# Patient Record
(demographics unavailable — no encounter records)

---

## 2025-06-10 NOTE — PHYSICAL EXAM
[Well Nourished] : well nourished [Well Developed] : well developed [Well Groomed] : well groomed [Active] : active [Alert] : ~L alert [Normal Breathing Pattern] : normal breathing pattern [No Nasal Drainage] : no nasal drainage [No Respiratory Distress] : no respiratory distress [No Oral Cyanosis] : no oral cyanosis [No Tonsillar Enlargement] : no tonsillar enlargement [No Stridor] : no stridor [Absence Of Retractions] : absence of retractions [Symmetric] : symmetric [Good Expansion] : good expansion [No Acc Muscle Use] : no accessory muscle use [Equal Breath Sounds] : equal breath sounds bilaterally [Good aeration to bases] : good aeration to bases [No Crackles] : no crackles [No Rhonchi] : no rhonchi [No Wheezing] : no wheezing [Normal Sinus Rhythm] : normal sinus rhythm [Soft, Non-Tender] : soft, non-tender [Full ROM] : full range of motion [No Clubbing] : no clubbing [Capillary Refill < 2 secs] : capillary refill less than two seconds [Abnormal Walk] : normal gait [No Cyanosis] : no cyanosis [Alert and  Oriented] : alert and oriented [No Rashes] : no rashes

## 2025-06-10 NOTE — CONSULT LETTER
[Dear  ___] : Dear  [unfilled], [Consult Letter:] : I had the pleasure of evaluating your patient, [unfilled]. [Please see my note below.] : Please see my note below. [Consult Closing:] : Thank you very much for allowing me to participate in the care of this patient.  If you have any questions, please do not hesitate to contact me. [Sincerely,] : Sincerely, [FreeTextEntry3] : KENN Delacruz Nurse Practitioner Division of Pediatric Pulmonary Medicine [facility]  [facility]

## 2025-06-10 NOTE — SOCIAL HISTORY
[Parent(s)] : parent(s) [___ Sisters] : [unfilled] sisters [None] : none [Smokers in Household] : there are smokers in the home [FreeTextEntry1] : older siblings in school.  [Bedroom] : not in the bedroom [Living Area] : not in the living area [de-identified] : mother outside the home.

## 2025-06-10 NOTE — HISTORY OF PRESENT ILLNESS
[FreeTextEntry1] : 36 weeker No NICU stay.  Cow's milk protein sensitivity. +RSV Dec 2024: Admitted to [facility]: +desat to 80% - first time use of albuterol. Received OCS (prednisone).  Since then, recurrent cough/wheeze with viral illnesses. Positive albuterol response.    1.5 weeks ago +URI, Received dexamethasone from PCP, parents report child's whole body was stiff and shaking, parents took child to ER worried he was having a seizure. Reportedly told he has allergy to dexamethasone.  Family changed Pediatricians, new PCP prescribed Augmentin for cough with positive response. Today is day 8/10.   New PCP referred to Pulmonology: [facility]. New PCP also advised Asmanex BID 2 puffs (parents have medication at home but did not start).  Albuterol last used a few days ago.    +API: +Fam hx of asthma: mother (childhood) and maternal uncle (severe).    NEW PATIENT - Ronnie 10, 2025 Age of Onset of symptoms: Dec 2024 +RSV.  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  PSH: denies Meds: albuterol nebs and HFA.  Allergies: cow's milk protein  Birth Hx: 36 weeker, no complications.  PCP/Specialists: [facility].   Family hx of asthma: YES (mother and maternal uncle).  Family hx of cystic fibrosis, autoimmune disease, recurrent respiratory infections: denies Feeding issues, JENNY: denies JENNIFER sx: denies Hx of Eczema: NO Hx of rhinitis, postnasal drip: NO Hx of recurrent infections (ie: pneumonia, AOM, sinusitis): NO Seen by pulmonologist before: NO   Cough Hx Triggers: URIs Hx of wheezing: YES Use of oral steroids: YES ED/Hospitalizations: one hospital admission Dec 2024, +RSV Daytime cough:  Denies. Well between episodes. Nighttime cough:  Denies. Well between episodes. Respiratory symptoms with exercise: SOB infrequently noted when running.  Chest x-ray: NO Vaccines UTD: YES Influenza vaccine: NO COVID 19 vaccine: NO H/o COVID19/RSV infection: RSV+ Dec 2024.      Modified Asthma Predictive Index (mAPI): 4 wheezing illnesses AND 1 major criteria Parental asthma: YES (mother and maternal uncle) atopic dermatitis: NO Aeroallergen sensitization suspected: NO   OR   2 minor criteria Food sensitization: YES (cow's milk protein) Peripheral blood eosinophilia =4%: Wheezing apart from colds: NO

## 2025-06-10 NOTE — REVIEW OF SYSTEMS
[Fever] : no fever [Snoring] : no snoring [Apnea] : no apnea [Nasal Congestion] : no nasal congestion [Wheezing] : wheezing [Heart Disease] : no heart disease [Cough] : cough [Pneumonia] : no pneumonia [Problems Swallowing] : no problems swallowing [Eczema] : no ezcema [Reflux] : no reflux [Failure To Thrive] : no failure to thrive [FreeTextEntry6] : no current cough/wheeze [Influenza Vaccine this Past Year] : no influenza vaccine this past year [Immunizations are up to date] : Immunizations are up to date

## 2025-06-10 NOTE — CONSULT LETTER
[Dear  ___] : Dear  [unfilled], [Consult Letter:] : I had the pleasure of evaluating your patient, [unfilled]. [Please see my note below.] : Please see my note below. [Consult Closing:] : Thank you very much for allowing me to participate in the care of this patient.  If you have any questions, please do not hesitate to contact me. [Sincerely,] : Sincerely, [FreeTextEntry3] : KENN Delacruz Nurse Practitioner Division of Pediatric Pulmonary Medicine & Cyst[facility] Fibrosis Center  [facility]

## 2025-06-10 NOTE — REVIEW OF SYSTEMS
[Fever] : no fever [Snoring] : no snoring [Apnea] : no apnea [Nasal Congestion] : no nasal congestion [Heart Disease] : no heart disease [Wheezing] : wheezing [Cough] : cough [Pneumonia] : no pneumonia [Problems Swallowing] : no problems swallowing [Reflux] : no reflux [Eczema] : no ezcema [Failure To Thrive] : no failure to thrive [FreeTextEntry6] : no current cough/wheeze [Influenza Vaccine this Past Year] : no influenza vaccine this past year [Immunizations are up to date] : Immunizations are up to date

## 2025-06-10 NOTE — SOCIAL HISTORY
[Parent(s)] : parent(s) [___ Sisters] : [unfilled] sisters [None] : none [Smokers in Household] : there are smokers in the home [FreeTextEntry1] : older siblings in school.  [Living Area] : not in the living area [Bedroom] : not in the bedroom [de-identified] : mother outside the home.

## 2025-06-10 NOTE — PHYSICAL EXAM
[Well Nourished] : well nourished [Well Developed] : well developed [Well Groomed] : well groomed [Alert] : ~L alert [Active] : active [Normal Breathing Pattern] : normal breathing pattern [No Nasal Drainage] : no nasal drainage [No Respiratory Distress] : no respiratory distress [No Tonsillar Enlargement] : no tonsillar enlargement [No Oral Cyanosis] : no oral cyanosis [No Stridor] : no stridor [Symmetric] : symmetric [Absence Of Retractions] : absence of retractions [No Acc Muscle Use] : no accessory muscle use [Good Expansion] : good expansion [Equal Breath Sounds] : equal breath sounds bilaterally [Good aeration to bases] : good aeration to bases [No Crackles] : no crackles [No Rhonchi] : no rhonchi [No Wheezing] : no wheezing [Normal Sinus Rhythm] : normal sinus rhythm [Soft, Non-Tender] : soft, non-tender [Full ROM] : full range of motion [No Clubbing] : no clubbing [Capillary Refill < 2 secs] : capillary refill less than two seconds [Alert and  Oriented] : alert and oriented [Abnormal Walk] : normal gait [No Cyanosis] : no cyanosis [No Rashes] : no rashes

## 2025-06-10 NOTE — HISTORY OF PRESENT ILLNESS
[FreeTextEntry1] : 36 weeker No NICU stay.  Cow's milk protein sensitivity. +RSV Dec 2024: Admitted to [facility]: +desat to 80% - first time use of albuterol. Received OCS (prednisone).  Since then, recurrent cough/wheeze with viral illnesses. Positive albuterol response.    1.5 weeks ago +URI, Received dexamethasone from PCP, parents report child's whole body was stiff and shaking, parents took child to ER worried he was having a seizure. Reportedly told he has allergy to dexamethasone.  Family changed Pediatricians, new PCP prescribed Augmentin for cough with positive response. Today is day 8/10.   New PCP referred to Pulmonology: [facility]. New PCP also advised Asmanex BID 2 puffs (parents have medication at home but did not start).  Albuterol last used a few days ago.    +API: +Fam hx of asthma: mother (childhood) and maternal uncle (severe).    NEW PATIENT - Ronnie 10, 2025 Age of Onset of symptoms: Dec 2024 +RSV.  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  PSH: denies Meds: albuterol nebs and HFA.  Allergies: cow's milk protein  Birth Hx: 36 weeker, no complications.  PCP/Specialists: [facility].   Family hx of asthma: YES (mother and maternal uncle).  Family hx of cystic fibrosis, autoimmune disease, recurrent respiratory infections: denies Feeding issues, JENNY: denies JENNIFER sx: denies Hx of Eczema: NO Hx of rhinitis, postnasal drip: NO Hx of recurrent infections (ie: pneumonia, AOM, sinusitis): NO Seen by pulmonologist before: NO   Cough Hx Triggers: URIs Hx of wheezing: YES Use of oral steroids: YES ED/Hospitalizations: one hospital admission Dec 2024, +RSV Daytime cough:  Denies. Well between episodes. Nighttime cough:  Denies. Well between episodes. Respiratory symptoms with exercise: SOB infrequently noted when running.  Chest x-ray: NO Vaccines UTD: YES Influenza vaccine: NO COVID 19 vaccine: NO H/o COVID19/RSV infection: RSV+ Dec 2024.      Modified Asthma Predictive Index (mAPI): 4 wheezing illnesses AND 1 major criteria Parental asthma: YES (mother and maternal uncle) atopic dermatitis: NO Aeroallergen sensitization suspected: NO   OR   2 minor criteria Food sensitization: YES (cow's milk protein) Peripheral blood eosinophilia =4%: Wheezing apart from colds: NO

## 2025-06-10 NOTE — REASON FOR VISIT
[Initial Evaluation] : an initial evaluation of [Cough] : cough [Wheezing] : wheezing [Parents] : parents